# Patient Record
Sex: MALE | Race: WHITE | NOT HISPANIC OR LATINO | URBAN - METROPOLITAN AREA
[De-identification: names, ages, dates, MRNs, and addresses within clinical notes are randomized per-mention and may not be internally consistent; named-entity substitution may affect disease eponyms.]

---
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
## 2020-05-21 ENCOUNTER — OFFICE VISIT - HEALTHEAST (OUTPATIENT)
Dept: INTERNAL MEDICINE | Facility: CLINIC | Age: 20
End: 2020-05-21

## 2020-05-21 DIAGNOSIS — R03.0 ELEVATED BLOOD PRESSURE READING WITHOUT DIAGNOSIS OF HYPERTENSION: ICD-10-CM

## 2020-05-21 DIAGNOSIS — R07.89 CHEST TIGHTNESS: ICD-10-CM

## 2020-05-21 DIAGNOSIS — R00.2 PALPITATIONS: ICD-10-CM

## 2020-05-21 DIAGNOSIS — F41.9 ANXIETY: ICD-10-CM

## 2020-05-21 DIAGNOSIS — R00.0 TACHYCARDIA: ICD-10-CM

## 2020-05-21 ASSESSMENT — PATIENT HEALTH QUESTIONNAIRE - PHQ9: SUM OF ALL RESPONSES TO PHQ QUESTIONS 1-9: 0

## 2020-05-22 ENCOUNTER — COMMUNICATION - HEALTHEAST (OUTPATIENT)
Dept: INTERNAL MEDICINE | Facility: CLINIC | Age: 20
End: 2020-05-22

## 2020-05-26 ENCOUNTER — AMBULATORY - HEALTHEAST (OUTPATIENT)
Dept: LAB | Facility: CLINIC | Age: 20
End: 2020-05-26

## 2020-05-26 ENCOUNTER — AMBULATORY - HEALTHEAST (OUTPATIENT)
Dept: NURSING | Facility: CLINIC | Age: 20
End: 2020-05-26

## 2020-05-26 ENCOUNTER — HOSPITAL ENCOUNTER (OUTPATIENT)
Dept: CARDIOLOGY | Facility: CLINIC | Age: 20
Discharge: HOME | End: 2020-05-26
Attending: INTERNAL MEDICINE

## 2020-05-26 ENCOUNTER — COMMUNICATION - HEALTHEAST (OUTPATIENT)
Dept: INTERNAL MEDICINE | Facility: CLINIC | Age: 20
End: 2020-05-26

## 2020-05-26 DIAGNOSIS — R00.0 TACHYCARDIA: ICD-10-CM

## 2020-05-26 DIAGNOSIS — R03.0 ELEVATED BLOOD PRESSURE READING WITHOUT DIAGNOSIS OF HYPERTENSION: ICD-10-CM

## 2020-05-26 DIAGNOSIS — R00.2 PALPITATIONS: ICD-10-CM

## 2020-05-26 DIAGNOSIS — R07.89 CHEST TIGHTNESS: ICD-10-CM

## 2020-05-26 LAB
ALBUMIN UR-MCNC: NEGATIVE MG/DL
AORTIC ROOT: 3.5 CM
AORTIC VALVE MEAN VELOCITY: 77.6 CM/S
APPEARANCE UR: CLEAR
ASCENDING AORTA: 3.1 CM
ATRIAL RATE - MUSE: 93 BPM
AV DIMENSIONLESS INDEX VTI: 1.1
AV MEAN GRADIENT: 3 MMHG
AV PEAK GRADIENT: 4.4 MMHG
AV VALVE AREA: 4 CM2
AV VELOCITY RATIO: 1.1
BACTERIA #/AREA URNS HPF: ABNORMAL HPF
BILIRUB UR QL STRIP: NEGATIVE
BSA FOR ECHO PROCEDURE: 1.84 M2
COLOR UR AUTO: YELLOW
CV BLOOD PRESSURE: ABNORMAL MMHG
CV ECHO HEIGHT: 73 IN
CV ECHO WEIGHT: 146 LBS
D DIMER PPP FEU-MCNC: <0.27 FEU UG/ML
DIASTOLIC BLOOD PRESSURE - MUSE: NORMAL
DOP CALC AO PEAK VEL: 105 CM/S
DOP CALC AO VTI: 19.8 CM
DOP CALC LVOT AREA: 3.8 CM2
DOP CALC LVOT DIAMETER: 2.2 CM
DOP CALC LVOT PEAK VEL: 116 CM/S
DOP CALC LVOT STROKE VOLUME: 79.8 CM3
DOP CALCLVOT PEAK VEL VTI: 21 CM
EJECTION FRACTION: 61 % (ref 55–75)
FRACTIONAL SHORTENING: 33.9 % (ref 28–44)
GLUCOSE UR STRIP-MCNC: NEGATIVE MG/DL
HGB UR QL STRIP: ABNORMAL
INTERPRETATION ECG - MUSE: NORMAL
INTERVENTRICULAR SEPTUM IN END DIASTOLE: 1.08 CM (ref 0.6–1)
IVS/PW RATIO: 1.1
KETONES UR STRIP-MCNC: NEGATIVE MG/DL
LA AREA 1: 15.1 CM2
LA AREA 2: 15.7 CM2
LEFT ATRIUM LENGTH: 5.3 CM
LEFT ATRIUM SIZE: 3.1 CM
LEFT ATRIUM TO AORTIC ROOT RATIO: 0.89 NO UNITS
LEFT ATRIUM VOLUME INDEX: 20.7 ML/M2
LEFT ATRIUM VOLUME: 38 ML
LEFT VENTRICLE CARDIAC INDEX: 4.1 L/MIN/M2
LEFT VENTRICLE CARDIAC OUTPUT: 7.6 L/MIN
LEFT VENTRICLE DIASTOLIC VOLUME INDEX: 38.5 CM3/M2 (ref 34–74)
LEFT VENTRICLE DIASTOLIC VOLUME: 70.9 CM3 (ref 62–150)
LEFT VENTRICLE HEART RATE: 95 BPM
LEFT VENTRICLE MASS INDEX: 96.7 G/M2
LEFT VENTRICLE SYSTOLIC VOLUME INDEX: 14.9 CM3/M2 (ref 11–31)
LEFT VENTRICLE SYSTOLIC VOLUME: 27.4 CM3 (ref 21–61)
LEFT VENTRICULAR INTERNAL DIMENSION IN DIASTOLE: 4.81 CM (ref 4.2–5.8)
LEFT VENTRICULAR INTERNAL DIMENSION IN SYSTOLE: 3.18 CM (ref 2.5–4)
LEFT VENTRICULAR MASS: 178 G
LEFT VENTRICULAR OUTFLOW TRACT MEAN GRADIENT: 3 MMHG
LEFT VENTRICULAR OUTFLOW TRACT MEAN VELOCITY: 72.1 CM/S
LEFT VENTRICULAR OUTFLOW TRACT PEAK GRADIENT: 5 MMHG
LEFT VENTRICULAR POSTERIOR WALL IN END DIASTOLE: 0.98 CM (ref 0.6–1)
LEUKOCYTE ESTERASE UR QL STRIP: NEGATIVE
LV STROKE VOLUME INDEX: 43.4 ML/M2
MITRAL VALVE DECELERATION SLOPE: 4660 MM/S2
MITRAL VALVE E/A RATIO: 1.2
MITRAL VALVE PRESSURE HALF-TIME: 52 MS
MV AVERAGE E/E' RATIO: 5.7 CM/S
MV DECELERATION TIME: 176 MS
MV E'TISSUE VEL-LAT: 18 CM/S
MV E'TISSUE VEL-MED: 11 CM/S
MV LATERAL E/E' RATIO: 4.6
MV MEDIAL E/E' RATIO: 7.5
MV PEAK A VELOCITY: 69.4 CM/S
MV PEAK E VELOCITY: 82 CM/S
MV VALVE AREA PRESSURE 1/2 METHOD: 4.2 CM2
NITRATE UR QL: NEGATIVE
NUC REST DIASTOLIC VOLUME INDEX: 2328 LBS
NUC REST SYSTOLIC VOLUME INDEX: 73 IN
P AXIS - MUSE: 79 DEGREES
PH UR STRIP: 6 [PH] (ref 5–8)
PR INTERVAL - MUSE: 144 MS
QRS DURATION - MUSE: 86 MS
QT - MUSE: 354 MS
QTC - MUSE: 440 MS
R AXIS - MUSE: 86 DEGREES
RBC #/AREA URNS AUTO: ABNORMAL HPF
SP GR UR STRIP: >=1.03 (ref 1–1.03)
SQUAMOUS #/AREA URNS AUTO: ABNORMAL LPF
SYSTOLIC BLOOD PRESSURE - MUSE: NORMAL
T AXIS - MUSE: 64 DEGREES
T3 SERPL-MCNC: 110 NG/DL (ref 45–175)
T4 FREE SERPL-MCNC: 1.1 NG/DL (ref 0.7–1.8)
TRICUSPID VALVE ANULAR PLANE SYSTOLIC EXCURSION: 2 CM
TSH SERPL DL<=0.005 MIU/L-ACNC: 1.2 UIU/ML (ref 0.3–5)
UROBILINOGEN UR STRIP-ACNC: ABNORMAL
VENTRICULAR RATE- MUSE: 93 BPM
WBC #/AREA URNS AUTO: ABNORMAL HPF

## 2020-05-26 ASSESSMENT — MIFFLIN-ST. JEOR: SCORE: 1718.86

## 2020-06-01 ENCOUNTER — AMBULATORY - HEALTHEAST (OUTPATIENT)
Dept: LAB | Facility: CLINIC | Age: 20
End: 2020-06-01

## 2020-06-01 DIAGNOSIS — R00.0 TACHYCARDIA: ICD-10-CM

## 2020-06-01 DIAGNOSIS — R03.0 ELEVATED BLOOD PRESSURE READING WITHOUT DIAGNOSIS OF HYPERTENSION: ICD-10-CM

## 2020-06-01 DIAGNOSIS — R00.2 PALPITATIONS: ICD-10-CM

## 2020-06-01 DIAGNOSIS — R07.89 CHEST TIGHTNESS: ICD-10-CM

## 2020-06-01 DIAGNOSIS — F41.9 ANXIETY: ICD-10-CM

## 2020-06-04 LAB
COLLECT DURATION TIME SPEC: 24 HR
CREAT 24H UR-MRATE: 2044 MG/D (ref 1000–2500)
CREAT UR-MCNC: 73 MG/DL
METANEPH 24H UR-MCNC: 51 UG/L
METANEPH 24H UR-MRATE: 143 UG/D (ref 55–320)
METANEPH+NORMETANEPH UR-IMP: NORMAL
METANEPH/CREAT 24H UR: 70 UG/G CRT (ref 0–300)
NORMETANEPHRINE 24H UR-MCNC: 83 UG/L
NORMETANEPHRINE 24H UR-MRATE: 232 UG/D (ref 81–667)
NORMETANEPHRINE/CREAT 24H UR: 114 UG/G CRT (ref 0–400)
SPECIMEN VOL ?TM UR: 2800 ML

## 2020-06-05 LAB
CATECHOLS UR-IMP: NORMAL
COLLECT DURATION TIME SPEC: 24 HR
CREAT 24H UR-MRATE: 2044 MG/D (ref 1000–2500)
CREAT UR-MCNC: 73 MG/DL
DOPAMINE 24H UR-MRATE: 274 UG/D (ref 71–485)
DOPAMINE UR-MCNC: 98 UG/L
DOPAMINE/CREAT UR: 134 UG/G CRT (ref 0–250)
EPINEPH 24H UR-MRATE: 8 UG/D (ref 1–14)
EPINEPH UR-MCNC: 3 UG/L
EPINEPH/CREAT UR: 4 UG/G CRT (ref 0–20)
NOREPINEPH 24H UR-MRATE: 39 UG/D (ref 14–120)
NOREPINEPH UR-MCNC: 14 UG/L
NOREPINEPH/CREAT UR: 19 UG/G CRT (ref 0–45)
SPECIMEN VOL ?TM UR: 2800 ML

## 2021-05-27 ASSESSMENT — PATIENT HEALTH QUESTIONNAIRE - PHQ9: SUM OF ALL RESPONSES TO PHQ QUESTIONS 1-9: 0

## 2021-05-31 VITALS — WEIGHT: 145.5 LBS

## 2021-06-04 VITALS — WEIGHT: 145.5 LBS | BODY MASS INDEX: 19.28 KG/M2 | HEIGHT: 73 IN

## 2021-06-06 ENCOUNTER — HEALTH MAINTENANCE LETTER (OUTPATIENT)
Age: 21
End: 2021-06-06

## 2021-06-08 NOTE — TELEPHONE ENCOUNTER
Dr. Merrill,    Need order to for Echo Complete to be changed to priority STAT to have done urgently. Current order is routine for testing.     Thanks

## 2021-06-08 NOTE — PROGRESS NOTES
"Sloan Brar is a 19 y.o. male who is being evaluated via a billable video visit.      The patient has been notified of following:     \"This video visit will be conducted via a call between you and your physician/provider. We have found that certain health care needs can be provided without the need for an in-person physical exam.  This service lets us provide the care you need with a video conversation.  If a prescription is necessary we can send it directly to your pharmacy.  If lab work is needed we can place an order for that and you can then stop by our lab to have the test done at a later time.    Video visits are billed at different rates depending on your insurance coverage. Please reach out to your insurance provider with any questions.    If during the course of the call the physician/provider feels a video visit is not appropriate, you will not be charged for this service.\"    Patient has given verbal consent to a Video visit? Yes    Patient would like to receive their AVS by AVS Preference: Mail a copy.    Patient would like the video invitation sent by: Text to cell phone: [phone]    Will anyone else be joining your video visit? No    Video Start Time: 10:41 AM    Additional provider notes: GENERAL: Healthy, alert and no distress  EYES: Eyes grossly normal to inspection. No discharge or erythema, or obvious scleral/conjunctival abnormalities.  RESP: No audible wheeze, cough, or visible cyanosis.  No visible retractions or increased work of breathing.    NEURO: Cranial nerves grossly intact. Mentation and speech appropriate for age.  PSYCH: Mentation appears normal, affect normal/bright, judgement and insight intact, normal speech and appearance well-groomed      Video-Visit Details    Type of service:  Video Visit    Video End Time (time video stopped): 11:07 AM  Originating Location (pt. Location): Home    Distant Location (provider location):  [facility]     Platform used for Video " Visit: Doximnadai Merrill MD       Office Visit - New Patient   Sloan Brar   19 y.o.  male    Date of visit: 5/21/2020  Physician: Jeronimo Merrill MD     Assessment and Plan   1. Palpitations  Discussed at length with the patient.  Fairly unremarkable evaluation in urgent care including EKG showing only tachycardia and normal labs.  I would like to continue evaluation including looking for possibility of blood clots with a d-dimer, unlikely pheochromocytoma but need to evaluate given hypertension, tachycardia, rotations, sweating and anxiety.  We will also do more testing for hyperthyroidism.  Will obtain an echocardiogram.  If this all looks okay consider Holter monitor versus addressing possible underlying anxiety  - Metanephrines Fractionated by HPLC-MS/MS, Urine; Future  - Catecholamine Fractionation, Free, 24 Hour Urine; Future  - T4, Free; Future  - T3, Total; Future  - Echo Complete; Future  - Electrocardiogram Perform and Read; Future  - Thyroid Stimulating Hormone (TSH); Future    2. Tachycardia  - Metanephrines Fractionated by HPLC-MS/MS, Urine; Future  - Catecholamine Fractionation, Free, 24 Hour Urine; Future  - T4, Free; Future  - T3, Total; Future  - Echo Complete; Future  - Electrocardiogram Perform and Read; Future  - D-dimer, Quantitative; Future  - Thyroid Stimulating Hormone (TSH); Future    3. Chest tightness  - Metanephrines Fractionated by HPLC-MS/MS, Urine; Future  - Catecholamine Fractionation, Free, 24 Hour Urine; Future  - Echo Complete; Future  - Electrocardiogram Perform and Read; Future  - D-dimer, Quantitative; Future    4. Anxiety  - Metanephrines Fractionated by HPLC-MS/MS, Urine; Future  - Catecholamine Fractionation, Free, 24 Hour Urine; Future    5. Elevated blood pressure reading without diagnosis of hypertension  - Metanephrines Fractionated by HPLC-MS/MS, Urine; Future  - Catecholamine Fractionation, Free, 24 Hour Urine; Future  - T4, Free; Future  - T3,  Total; Future  - Urinalysis-UC if Indicated; Future  - Echo Complete; Future  - Electrocardiogram Perform and Read; Future  - Thyroid Stimulating Hormone (TSH); Future      Return in about 2 weeks (around 6/4/2020) for video visit if symptoms fail to improve.     Chief Complaint   Chief Complaint   Patient presents with     Irregular Heart Beat        Patient Profile   Social History     Social History Narrative    Lives with his parents.  Part time college ([employer]) and works at [employer].        Past Medical History   Patient Active Problem List   Diagnosis     Coloboma of iris       Past Surgical History  He has a past surgical history that includes No past surgeries.     History of Present Illness   This 19 y.o. old man is being seen, to establish care, and for evaluation of some abnormal symptoms has been experiencing.  For the past month or so he is noticed his heart is racing.  Wakes up pounding and he feels anxious and is sweating.  Occasionally has had some chest tightness with this.  He went to urgent care and was found to be hypertensive.  He had an EKG which showed tachycardia but otherwise unremarkable.  Laboratory evaluation was unremarkable.  He continues to have episodes like this twice per day.  He does not feel anxious except for when these episodes are occurring.  He otherwise is healthy.  He does not smoke cigarettes, drink alcohol or use illicit drugs.  No leg swelling no family history of blood clots.    Review of Systems: A comprehensive review of systems was negative except as noted.     Medications and Allergies   No current outpatient medications on file.     No current facility-administered medications for this visit.      No Known Allergies     Family and Social History   Family History   Problem Relation Age of Onset     No Medical Problems Mother      No Medical Problems Father      No Medical Problems Sister      No Medical Problems Sister         Social History     Tobacco Use      Smoking status: Never Smoker     Smokeless tobacco: Never Used   Substance Use Topics     Alcohol use: Never     Frequency: Never     Drug use: Never        Physical Exam   See above     Additional Information   Review and/or order of clinical lab tests: Ordered and reviewed  Review and/or order of radiology tests: Ordered and reviewed   review and/or order of medicine tests: Ordered  Discussion of test results with performing physician:  Decision to obtain old records and/or obtain history from someone other than the patient:  Review and summarization of old records and/or obtaining history from someone other than the patient and.or discussion of case with another health care provider: I reviewed his records from care everywhere urgent care, summarized above  Independent visualization of image, tracing or specimen itself:    Time:      Jeronimo Merrill MD  Internal Medicine  Contact me at None

## 2021-06-08 NOTE — PROGRESS NOTES
Please help pt schedule [facility] pt wants labs and ekg on same day labs scheduled o 5/26/@8:15

## 2021-06-08 NOTE — TELEPHONE ENCOUNTER
Don't need to reorder, they have made notes to accept the order to make STAT to be done on 5/26/20.

## 2021-06-11 NOTE — PROGRESS NOTES
Assessment/Plan:    Sloan was seen today for facial droop.    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  There is no obvious trigger for this condition.  With the exception of physical exam findings consistent with Bell's palsy, his exam is unremarkable for stroke.  There is no evidence of Kiester Hunt syndrome.  I did discuss this with the emergency department physician at [facility] who suggested treating for Bell's palsy without further imaging given the lack of evidence at this could be a stroke or some type of central process.  -Pharmacologic therapy: Prednisone, valacyclovir.  -Over-the-counter eyedrops used regularly.    -Primary care follow-up in the next week.  -Referral to neurology place.  -Given the local prevalence of Lyme disease, we also checked for Lyme.  Result is pending.    Coloboma of iris: Appears unchanged.    Other orders  -     valACYclovir (VALTREX) 1000 MG tablet; Take 1 tablet (1,000 mg total) by mouth 3 (three) times a day for 7 days.  -     predniSONE (DELTASONE) 20 MG tablet; Take 4 tablets (80 mg total) by mouth daily for 7 days.       Return in about 1 week (around 7/9/2017) for recheck follow-up visit.    Gómez Sen MD  _______________________________    Chief Complaint   Patient presents with     Facial Droop     left side, started Friday pm.      Subjective: Sloan Brar is a 16 y.o. year old male who I have not seen in clinic before who presents with the following acute complaint(s):    Facial paralysis:   -Starting approximately 30 hours ago, this patient noticed decreased functioning of the left side of his face.  He first noticed it while he is brushing his teeth.  Yesterday, the dysfunction of  the muscles of his left side of his face worsened.  He is able to close his eyes but struggles to do so.  He started eyedrops yesterday.  He had an upper respiratory infection with congestion, cough, runny nose approximately 1-1/2-2 weeks ago.  He denies fever.  No chills.  No shortness of breath.  No palpitations.  No disequilibrium or vertiginous symptoms.  He has not fallen.  No head trauma.  No palliative measures.  No provocative factors have been identified by the patient or his father.  The patient has a congenital coloboma of the left eye.  Vision has not changed.    ROS: Complete review of systems obtained.  Pertinent items are listed above.     The following portions of the patient's history were reviewed and updated as appropriate: allergies, current medications, past family history, past medical history, past social history, past surgical history and problem list.     Objective:   /58  Pulse 100  Temp 98.4  F (36.9  C) (Oral)   Resp 10  Wt 145 lb 8 oz (66 kg)  SpO2 100%  General: No acute distress  Head: Normocephalic, atraumatic.  Eyes: No conjunctival injection, muscular icterus, pupils are not equal as there is a tear drop shaped pupil on the left side.  The pupils are responsive to light and a normal way.  Extra ocular movements are intact.  ENT: The tympanic membranes are well visualized and appear normal bilaterally.  The external auditory canal appears normal bilaterally.  There is no postauricular lymphadenopathy.  There is submandibular and anterior cervical lymphadenopathy.  No thyromegaly.  Posterior pharynx is normal with normal palatal function.  Tongue is midline.  Moist because membranes.  Mild tenderness to palpation over the TMJ joint/region on the left side.  Contralateral side is nontender.  Neurologic: There is paralysis of the left side of his face with a droop.  This affects the left side of his forehead.  He is able to raise his shoulders without difficulty.  He  struggles to close his eye on the left side but can do so.  Cranial nerves in the right half of his face are intact and appear normal.  2+ reflexes at the biceps, brachioradialis, patella.  Straight arm raise is normal.  Patient walks with normal gait.  Alert and oriented ×3.  Psych: Affect is blunted.  Skin: No rashes or lesions noted.  Respiratory: Nonlabored breathing.    This note has been dictated using voice recognition software. Any grammatical or context distortions are unintentional and inherent to the software

## 2021-06-16 PROBLEM — Q13.0 COLOBOMA OF IRIS: Status: ACTIVE | Noted: 2017-07-02

## 2021-06-20 NOTE — LETTER
Letter by Jeronimo Merrill MD at      Author: Jeronimo Merrill MD Service: -- Author Type: --    Filed:  Encounter Date: 5/26/2020 Status: (Other)         Sloan Brar  [address]  [place] [address]             May 26, 2020         Dear Mr. Brar,    Below are the results from your recent visit:    Resulted Orders   T4, Free   Result Value Ref Range    Free T4 1.1 0.7 - 1.8 ng/dL   T3, Total   Result Value Ref Range    T3, Total 110 45 - 175 ng/dL   Urinalysis-UC if Indicated   Result Value Ref Range    Color, UA Yellow Colorless, Yellow, Straw, Light Yellow    Clarity, UA Clear Clear    Glucose, UA Negative Negative    Bilirubin, UA Negative Negative    Ketones, UA Negative Negative    Specific Gravity, UA >=1.030 1.005 - 1.030    Blood, UA Trace (!) Negative    pH, UA 6.0 5.0 - 8.0    Protein, UA Negative Negative mg/dL    Urobilinogen, UA 0.2 E.U./dL 0.2 E.U./dL, 1.0 E.U./dL    Nitrite, UA Negative Negative    Leukocytes, UA Negative Negative    Bacteria, UA None Seen None Seen hpf    RBC, UA 0-2 None Seen, 0-2 hpf    WBC, UA None Seen None Seen, 0-5 hpf    Squam Epithel, UA None Seen None Seen, 0-5 lpf    Narrative    UC not indicated   D-dimer, Quantitative   Result Value Ref Range    D-Dimer, Quant <0.27 <=0.50 FEU ug/mL    Narrative    0.50 ug/mL(FEU) = cutoff value for exclusion of DVT/PE   Thyroid Stimulating Hormone (TSH)   Result Value Ref Range    TSH 1.20 0.30 - 5.00 uIU/mL       All labs look okay, excellent.  Still waiting on the urine test.    Please call with questions or contact us using [employer].    Sincerely,        Electronically signed by Jeronimo Merrill MD

## 2021-09-26 ENCOUNTER — HEALTH MAINTENANCE LETTER (OUTPATIENT)
Age: 21
End: 2021-09-26

## 2022-07-02 ENCOUNTER — HEALTH MAINTENANCE LETTER (OUTPATIENT)
Age: 22
End: 2022-07-02

## 2023-04-23 ENCOUNTER — HEALTH MAINTENANCE LETTER (OUTPATIENT)
Age: 23
End: 2023-04-23

## 2023-07-15 ENCOUNTER — HEALTH MAINTENANCE LETTER (OUTPATIENT)
Age: 23
End: 2023-07-15